# Patient Record
Sex: FEMALE | Race: WHITE | NOT HISPANIC OR LATINO | Employment: FULL TIME | URBAN - METROPOLITAN AREA
[De-identification: names, ages, dates, MRNs, and addresses within clinical notes are randomized per-mention and may not be internally consistent; named-entity substitution may affect disease eponyms.]

---

## 2024-06-11 ENCOUNTER — LAB (OUTPATIENT)
Dept: LAB | Facility: LAB | Age: 24
End: 2024-06-11
Payer: COMMERCIAL

## 2024-06-11 ENCOUNTER — OFFICE VISIT (OUTPATIENT)
Dept: PRIMARY CARE | Facility: CLINIC | Age: 24
End: 2024-06-11
Payer: COMMERCIAL

## 2024-06-11 VITALS
HEIGHT: 66 IN | SYSTOLIC BLOOD PRESSURE: 118 MMHG | BODY MASS INDEX: 23.46 KG/M2 | OXYGEN SATURATION: 100 % | DIASTOLIC BLOOD PRESSURE: 70 MMHG | HEART RATE: 79 BPM | WEIGHT: 146 LBS

## 2024-06-11 DIAGNOSIS — Z13.220 LIPID SCREENING: ICD-10-CM

## 2024-06-11 DIAGNOSIS — Z00.00 ENCOUNTER FOR WELL ADULT EXAM WITHOUT ABNORMAL FINDINGS: ICD-10-CM

## 2024-06-11 DIAGNOSIS — Z13.220 LIPID SCREENING: Primary | ICD-10-CM

## 2024-06-11 LAB
CHOLEST SERPL-MCNC: 136 MG/DL (ref 0–199)
CHOLESTEROL/HDL RATIO: 2.4
HDLC SERPL-MCNC: 57 MG/DL
LDLC SERPL CALC-MCNC: 69 MG/DL
NON HDL CHOLESTEROL: 79 MG/DL (ref 0–149)
TRIGL SERPL-MCNC: 50 MG/DL (ref 0–149)
VLDL: 10 MG/DL (ref 0–40)

## 2024-06-11 PROCEDURE — 36415 COLL VENOUS BLD VENIPUNCTURE: CPT

## 2024-06-11 PROCEDURE — 99385 PREV VISIT NEW AGE 18-39: CPT | Performed by: INTERNAL MEDICINE

## 2024-06-11 PROCEDURE — 1036F TOBACCO NON-USER: CPT | Performed by: INTERNAL MEDICINE

## 2024-06-11 PROCEDURE — 80061 LIPID PANEL: CPT

## 2024-06-11 ASSESSMENT — ENCOUNTER SYMPTOMS
SHORTNESS OF BREATH: 0
COUGH: 0
DIARRHEA: 0
NAUSEA: 0
ABDOMINAL PAIN: 0
BACK PAIN: 0
ARTHRALGIAS: 0
FATIGUE: 0
PALPITATIONS: 0
VOMITING: 0
CHEST TIGHTNESS: 0
BLOOD IN STOOL: 0
WHEEZING: 0

## 2024-06-11 ASSESSMENT — PATIENT HEALTH QUESTIONNAIRE - PHQ9
2. FEELING DOWN, DEPRESSED OR HOPELESS: NOT AT ALL
1. LITTLE INTEREST OR PLEASURE IN DOING THINGS: NOT AT ALL
SUM OF ALL RESPONSES TO PHQ9 QUESTIONS 1 AND 2: 0

## 2024-06-11 NOTE — ASSESSMENT & PLAN NOTE
-She will try to make an appointment with dermatology soon for a full body skin inspection due to her multiple moles and freckles and she will call here if she needs an official referral  -I remind her to get in for her Pap exam as soon as she can and if she needs a referral or would like advice on appropriate or we are happy to do so  -She will go for a fasting lipid profile and we will call her with results

## 2024-06-11 NOTE — PATIENT INSTRUCTIONS
As we discussed please try to make an appointment with a gynecologist at your earliest convenience so that you can get caught up with cancer screening.  If you are in need of referral please let me know  Please also make sure that you make an appointment with dermatology for full body skin inspection.  If you are in need of a referral let us know  I have ordered a fasting lipid profile and please go at your earliest convenience for the lab work.  We typically like for individuals to fast anywhere from 8 to 12 hours.  For example if you decided to go to the lab at 8:00 in the morning he was simply cut off anything with calories after 8 PM the night before.  You can have water and we actually encourage you to drink lots of water prior to your lab draw  I will call you when these results come back

## 2024-06-11 NOTE — PROGRESS NOTES
Subjective   Patient ID: Acacia Enrique is a 24 y.o. female who presents for Follow-up.  HPI  She is here today for a well visit.  We saw her last back in August 2020.  She states that she has been doing well all that time and is taking no medications.  Her blood pressure is excellent today and her weight is healthy for her height.  We did conduct a full review of systems and she denies any symptoms at this time.  We discussed health maintenance and she is due for a Pap exam.  She states she will be researching providers and she will make an appointment soon to get caught up on her screenings.  She also has multiple moles and freckles and she states she intends on seeing the dermatologist.  She states that she she does not need a referral from primary care but if she finds out differently all she needs to do is call.  She does have multiple moles and freckles and I do feel that seeing a dermatologist for a full body skin inspection would be in her best interest.  We also discussed wearing sunscreen and avoiding prolonged exposure to the sun.  We also discussed the importance of routine exercise, eating a healthy diet, avoiding excessive alcohol, wearing a seatbelt when she drives, going for regular Pap exams, and she will go for a screening lipid profile for which I will call her with the results.  Otherwise if everything goes according to plan we will simply see her back on an annual basis for her wellness checkups and sooner if any problems.  Review of Systems   Constitutional:  Negative for fatigue.   Respiratory:  Negative for cough, chest tightness, shortness of breath and wheezing.    Cardiovascular:  Negative for chest pain, palpitations and leg swelling.   Gastrointestinal:  Negative for abdominal pain, blood in stool, diarrhea, nausea and vomiting.   Musculoskeletal:  Negative for arthralgias and back pain.     Objective   Physical Exam  Vitals and nursing note reviewed.   Constitutional:       General: She  is not in acute distress.     Appearance: Normal appearance.   HENT:      Head: Normocephalic and atraumatic.   Eyes:      Conjunctiva/sclera: Conjunctivae normal.   Cardiovascular:      Rate and Rhythm: Normal rate and regular rhythm.      Heart sounds: Normal heart sounds.   Pulmonary:      Effort: No respiratory distress.      Breath sounds: No wheezing.   Abdominal:      Palpations: Abdomen is soft.      Tenderness: There is no abdominal tenderness. There is no guarding.   Musculoskeletal:         General: No swelling. Normal range of motion.   Skin:     General: Skin is warm and dry.   Neurological:      General: No focal deficit present.      Mental Status: She is alert and oriented to person, place, and time.   Psychiatric:         Behavior: Behavior normal.       Assessment/Plan   Problem List Items Addressed This Visit             ICD-10-CM    Encounter for well adult exam without abnormal findings - Primary Z00.00     -She will try to make an appointment with dermatology soon for a full body skin inspection due to her multiple moles and freckles and she will call here if she needs an official referral  -I remind her to get in for her Pap exam as soon as she can and if she needs a referral or would like advice on appropriate or we are happy to do so  -She will go for a fasting lipid profile and we will call her with results               Claudia Villalba, DO